# Patient Record
Sex: FEMALE | Race: WHITE | Employment: FULL TIME | URBAN - METROPOLITAN AREA
[De-identification: names, ages, dates, MRNs, and addresses within clinical notes are randomized per-mention and may not be internally consistent; named-entity substitution may affect disease eponyms.]

---

## 2021-01-28 ENCOUNTER — AMBULATORY - HEALTHEAST (OUTPATIENT)
Dept: NURSING | Facility: CLINIC | Age: 41
End: 2021-01-28

## 2021-02-18 ENCOUNTER — AMBULATORY - HEALTHEAST (OUTPATIENT)
Dept: NURSING | Facility: CLINIC | Age: 41
End: 2021-02-18

## 2022-12-23 ENCOUNTER — TRANSFERRED RECORDS (OUTPATIENT)
Dept: HEALTH INFORMATION MANAGEMENT | Facility: CLINIC | Age: 42
End: 2022-12-23

## 2022-12-29 ENCOUNTER — TRANSCRIBE ORDERS (OUTPATIENT)
Dept: OTHER | Age: 42
End: 2022-12-29

## 2022-12-29 DIAGNOSIS — M25.551 RIGHT HIP PAIN: Primary | ICD-10-CM

## 2023-01-17 ENCOUNTER — THERAPY VISIT (OUTPATIENT)
Dept: PHYSICAL THERAPY | Facility: CLINIC | Age: 43
End: 2023-01-17
Attending: FAMILY MEDICINE
Payer: COMMERCIAL

## 2023-01-17 DIAGNOSIS — M25.551 HIP PAIN, RIGHT: ICD-10-CM

## 2023-01-17 PROCEDURE — 97161 PT EVAL LOW COMPLEX 20 MIN: CPT | Mod: GP | Performed by: PHYSICAL THERAPIST

## 2023-01-17 PROCEDURE — 97112 NEUROMUSCULAR REEDUCATION: CPT | Mod: GP | Performed by: PHYSICAL THERAPIST

## 2023-01-17 PROCEDURE — 97110 THERAPEUTIC EXERCISES: CPT | Mod: GP | Performed by: PHYSICAL THERAPIST

## 2023-01-17 NOTE — PROGRESS NOTES
Physical Therapy Initial Evaluation  Subjective:    Patient Health History  Consuelo Schmid being seen for R hip pain.     Problem began: 12/23/2022 (MD visit).   Problem occurred: 6 months ago insidious and gradual onset over time with no specific injury.  Has had L hip pain in the past and also has had LBP in the past but not now.   Pain is reported as 9/10 on pain scale.  General health as reported by patient is good.  Pertinent medical history includes: migraines/headaches, smoking, overweight, depression and history of fractures (tibia/fibula fracture when 3 years old, Raynouds syndrome).   Red flags:  Pain at rest/night.  Medical allergies: other. Other medical allergies details: amoxicillin.   Surgeries include:  Other. Other surgery history details: ethelvalery alejandro L foot excision, C secection x 2, cholescystectomy.    Current medications:  Anti-inflammatory and anti-depressants.    Current occupation is Physician Assistant.   Primary job tasks include:  Computer work, prolonged sitting and prolonged standing.   Other job/home tasks details: getting up from a crouch, sitting on the floor.                Therapist Generated HPI Evaluation         Type of problem:  Right hip.    This is a chronic condition.      Patient reports pain:  Anterior, groin, lateral and IT band (lateral thigh to knee occasionally).  Pain is described as aching and is intermittent.  Pain radiates to:  Thigh. Pain is worse during the day (with certain activities).  Since onset symptoms are gradually worsening.  Associated symptoms:  Loss of strength. Exacerbated by: getting up from crouch position, doing skin exams at work., getting up after sitting even 15 min painful, not able to sit on the floor with kids as too painful/difficult to get up after.  Sitting with leg in ER.  Overall goal to get more active safely.  and relieved by ice and NSAID's.  Special tests included:  X-ray (xray pelvis/hip unremarkable other than mild DJD B  SIJ ).    Restrictions due to condition include:  Working in normal job without restrictions.  Barriers include:  None as reported by patient (2 kids at home 9 and 11 years old).                        Objective:  System         Lumbar/SI Evaluation  ROM:    AROM Lumbar:   Flexion:          No loss  Ext:                    Mod loss    Side Bend:        Left:     Right:   Rotation:           Left:     Right:   Side Glide:        Left:  No loss mild lateral hip strain, NW after    Right:  No loss mild R LBP, NW after                                                              Hip Evaluation  HIP AROM:    Flexion: Left: 90    Right:  90 +pain     Extension: Left: 33    Right:  25  Abduction: Left:  35    Right:  35      Internal Rotation: Left: 38 + pinch    Right: 30 +pinch at ERP  External Rotation: Left: 64    Right: 80 no pain        Hip Strength:    Flexion:   Left: 5/5   Pain:  Right: 5-/5    Pain: strong/painful                    Extension:  Left: 4+/5  Pain:Right: 4+/5    Pain:    Abduction:  Left: 4/5      Pain:weak/painfulRight: 4/5     Pain:weak/pain free  Adduction:  Left: /5   Pain:weak/painful  Internal Rotation:  Left: 5/5    Pain:Right: 5/5   Pain:  External Rotation:  Left: 4+/5    Pain: strong/painful  Right: 4+/5    Pain: strong/painful  Knee Flexion:  Left: 5/5   Pain:Right: 5/5   Pain:  Knee Extension:  Left: 5/5   Pain:Right: 5/5    Pain:        Hip Special Testing:   Special tests hip not assessed: Pain with piriformis testing <90 and >90 with pain pinching in the groin- not from piriformis.  (-) B hip scour.  (-) long axis log roll     Left hip negative for the following special tests:  Piriformis; Evelyne; Fadir/Labrum or SLR  Right hip negative for the following special tests:  Piriformis; Evelyne; Fadir/Labrum or SLR                   Arnold Lumbar Evaluation    Posture:  Sitting: fair  Standing: fair  Lordosis: WNL  Lateral Shift: no  Correction of Posture: better                                                    ROS    Assessment/Plan:    Patient is a 42 year old female with right side hip complaints.    Patient has the following significant findings with corresponding treatment plan.                Diagnosis 1:  R hip pain    Pain -  hot/cold therapy, manual therapy, self management, education and home program  Decreased ROM/flexibility - manual therapy, therapeutic exercise, therapeutic activity and home program  Decreased strength - therapeutic exercise, therapeutic activities and home program  Decreased function - therapeutic activities and home program  Impaired posture - neuro re-education, therapeutic activities and home program    Therapy Evaluation Codes:    Cumulative Therapy Evaluation is: Low complexity.    Previous and current functional limitations:  (See Goal Flow Sheet for this information)    Short term and Long term goals: (See Goal Flow Sheet for this information)     Communication ability:  Patient appears to be able to clearly communicate and understand verbal and written communication and follow directions correctly.  Treatment Explanation - The following has been discussed with the patient:   RX ordered/plan of care  Anticipated outcomes  Possible risks and side effects  This patient would benefit from PT intervention to resume normal activities.   Rehab potential is good.    Frequency:  1 X week, once daily  Duration:  for 6 weeks  Discharge Plan:  Achieve all LTG.  Independent in home treatment program.  Reach maximal therapeutic benefit.    Please refer to the daily flowsheet for treatment today, total treatment time and time spent performing 1:1 timed codes.

## 2023-01-17 NOTE — LETTER
[facility]  [address] [address] [place] [id number]  [phone]    2023    Re: Consuelo Schmid   :   1980  MRN:  [id number]   REFERRING PHYSICIAN:   Kathy DOUGLAS [facility]    Date of Initial Evaluation:  2023  Visits:  Rxs Used: 1  Reason for Referral:  Hip pain, right    EVALUATION SUMMARY    Physical Therapy Initial Evaluation  Subjective:    Patient Health History  Consuelo Schmid being seen for R hip pain.     Problem began: 2022 (MD visit).   Problem occurred: 6 months ago insidious and gradual onset over time with no specific injury.  Has had L hip pain in the past and also has had LBP in the past but not now.   Pain is reported as 9/10 on pain scale.  General health as reported by patient is good.  Pertinent medical history includes: migraines/headaches, smoking, overweight, depression and history of fractures (tibia/fibula fracture when 3 years old, Raynouds syndrome).   Red flags:  Pain at rest/night.  Medical allergies: other. Other medical allergies details: amoxicillin.   Surgeries include:  Other. Other surgery history details: daniella allen L foot excision, C secection x 2, cholescystectomy.    Current medications:  Anti-inflammatory and anti-depressants.    Current occupation is Physician Assistant.   Primary job tasks include:  Computer work, prolonged sitting and prolonged standing.   Other job/home tasks details: getting up from a crouch, sitting on the floor.                Therapist Generated HPI Evaluation         Type of problem:  Right hip.    This is a chronic condition.      Patient reports pain:  Anterior, groin, lateral and IT band (lateral thigh to knee occasionally).  Pain is described as aching and is intermittent.  Re: Consuelo Schmid   :   1980  MRN:  [id number]       Pain radiates to:  Thigh. Pain is worse during the  day (with certain activities).  Since onset symptoms are gradually worsening.  Associated symptoms:  Loss of strength. Exacerbated by: getting up from crouch position, doing skin exams at work., getting up after sitting even 15 min painful, not able to sit on the floor with kids as too painful/difficult to get up after.  Sitting with leg in ER.  Overall goal to get more active safely.  and relieved by ice and NSAID's.  Special tests included:  X-ray (xray pelvis/hip unremarkable other than mild DJD B SIJ ).    Restrictions due to condition include:  Working in normal job without restrictions.  Barriers include:  None as reported by patient (2 kids at home 9 and 11 years old).    Objective:  System    Lumbar/SI Evaluation  ROM:    AROM Lumbar:   Flexion:          No loss  Ext:                    Mod loss    Side Bend:        Left:     Right:   Rotation:           Left:     Right:   Side Glide:        Left:  No loss mild lateral hip strain, NW after    Right:  No loss mild R LBP, NW after        Hip Evaluation  HIP AROM:    Flexion: Left: 90    Right:  90 +pain   Extension: Left: 33    Right:  25  Abduction: Left:  35    Right:  35  Internal Rotation: Left: 38 + pinch    Right: 30 +pinch at ERP  External Rotation: Left: 64    Right: 80 no pain    Hip Strength:    Flexion:   Left: 5/5   Pain:  Right: 5-/5    Pain: strong/painful                    Extension:  Left: 4+/5  Pain:Right: 4+/5    Pain:    Abduction:  Left: 4/5      Pain:weak/painfulRight: 4/5     Pain:weak/pain free  Adduction:  Left: /5   Pain:weak/painful  Internal Rotation:  Left: 5/5    Pain:Right: 5/5   Pain:  External Rotation:  Left: 4+/5    Pain: strong/painful  Right: 4+/5    Pain: strong/painful  Knee Flexion:  Left: 5/5   Pain:Right: 5/5   Pain:  Knee Extension:  Left: 5/5   Pain:Right: 5/5    Pain:    Hip Special Testing:   Special tests hip not assessed: Pain with piriformis testing <90 and >90 with pain pinching in the groin- not from  piriformis.  (-) B hip scour.  (-) long axis log roll     Left hip negative for the following special tests:  Piriformis; Evelyne; Fadir/Labrum or SLR  Right hip negative for the following special tests:  Piriformis; Evelyne; Fadir/Labrum or SLR        Arnold Lumbar Evaluation    Posture:  Sitting: fair  Standing: fair  Lordosis: WNL  Lateral Shift: no  Correction of Posture: better    ROS    Assessment/Plan:    Patient is a 42 year old female with right side hip complaints.    Patient has the following significant findings with corresponding treatment plan.                Diagnosis 1:  R hip pain    Pain -  hot/cold therapy, manual therapy, self management, education and home program  Decreased ROM/flexibility - manual therapy, therapeutic exercise, therapeutic activity and home program  Decreased strength - therapeutic exercise, therapeutic activities and home program  Decreased function - therapeutic activities and home program  Impaired posture - neuro re-education, therapeutic activities and home program    Therapy Evaluation Codes:    Cumulative Therapy Evaluation is: Low complexity.    Previous and current functional limitations:  (See Goal Flow Sheet for this information)    Short term and Long term goals: (See Goal Flow Sheet for this information)     Communication ability:  Patient appears to be able to clearly communicate and understand verbal and written communication and follow directions correctly.  Treatment Explanation - The following has been discussed with the patient:   RX ordered/plan of care  Anticipated outcomes  Possible risks and side effects  This patient would benefit from PT intervention to resume normal activities.   Rehab potential is good.    Frequency:  1 X week, once daily  Duration:  for 6 weeks  Discharge Plan:  Achieve all LTG.  Independent in home treatment program.  Reach maximal therapeutic benefit.                    Re: Consuelo Schmid   :   1980  MRN:  [id number]        Please refer to the daily flowsheet for treatment today, total treatment time and time spent performing 1:1 timed codes.         Thank you for your referral.    INQUIRIES  Therapist: Myranda Weston DPT, Cert MDT, [address] [address]  Phone: [phone]  Fax: [phone]

## 2023-01-18 ASSESSMENT — ACTIVITIES OF DAILY LIVING (ADL)
WALKING_UP_STEEP_HILLS: MODERATE DIFFICULTY
WALKING_APPROXIMATELY_10_MINUTES: SLIGHT DIFFICULTY
WALKING_15_MINUTES_OR_GREATER: MODERATE DIFFICULTY
WALKING_DOWN_STEEP_HILLS: SLIGHT DIFFICULTY
ROLLING_OVER_IN_BED: SLIGHT DIFFICULTY
RECREATIONAL_ACTIVITIES: MODERATE DIFFICULTY
PUTTING_ON_SOCKS_AND_SHOES: SLIGHT DIFFICULTY
WALKING_INITIALLY: MODERATE DIFFICULTY
TWISTING/PIVOTING_ON_INVOLVED_LEG: SLIGHT DIFFICULTY
HOS_ADL_COUNT: 16
SITTING_FOR_15_MINUTES: SLIGHT DIFFICULTY
HOS_ADL_SCORE(%): 68.75
LIGHT_TO_MODERATE_WORK: SLIGHT DIFFICULTY
STEPPING_UP_AND_DOWN_CURBS: NO DIFFICULTY AT ALL
GETTING_INTO_AND_OUT_OF_AN_AVERAGE_CAR: NO DIFFICULTY AT ALL
HOS_ADL_HIGHEST_POTENTIAL_SCORE: 64
GOING_DOWN_1_FLIGHT_OF_STAIRS: NO DIFFICULTY AT ALL
HEAVY_WORK: MODERATE DIFFICULTY
DEEP_SQUATTING: MODERATE DIFFICULTY
STANDING_FOR_15_MINUTES: MODERATE DIFFICULTY
HOS_ADL_ITEM_SCORE_TOTAL: 44
HOW_WOULD_YOU_RATE_YOUR_CURRENT_LEVEL_OF_FUNCTION_DURING_YOUR_USUAL_ACTIVITIES_OF_DAILY_LIVING_FROM_0_TO_100_WITH_100_BEING_YOUR_LEVEL_OF_FUNCTION_PRIOR_TO_YOUR_HIP_PROBLEM_AND_0_BEING_THE_INABILITY_TO_PERFORM_ANY_OF_YOUR_USUAL_DAILY_ACTIVITIES?: 80
GOING_UP_1_FLIGHT_OF_STAIRS: SLIGHT DIFFICULTY

## 2023-02-03 ENCOUNTER — THERAPY VISIT (OUTPATIENT)
Dept: PHYSICAL THERAPY | Facility: CLINIC | Age: 43
End: 2023-02-03
Attending: FAMILY MEDICINE
Payer: COMMERCIAL

## 2023-02-03 DIAGNOSIS — M25.551 HIP PAIN, RIGHT: Primary | ICD-10-CM

## 2023-02-03 PROCEDURE — 97140 MANUAL THERAPY 1/> REGIONS: CPT | Mod: GP

## 2023-02-03 PROCEDURE — 97110 THERAPEUTIC EXERCISES: CPT | Mod: GP

## 2023-02-10 ENCOUNTER — THERAPY VISIT (OUTPATIENT)
Dept: PHYSICAL THERAPY | Facility: CLINIC | Age: 43
End: 2023-02-10
Attending: FAMILY MEDICINE
Payer: COMMERCIAL

## 2023-02-10 DIAGNOSIS — M25.551 HIP PAIN, RIGHT: Primary | ICD-10-CM

## 2023-02-10 PROCEDURE — 97112 NEUROMUSCULAR REEDUCATION: CPT | Mod: GP

## 2023-02-10 PROCEDURE — 97110 THERAPEUTIC EXERCISES: CPT | Mod: GP

## 2023-02-24 ENCOUNTER — THERAPY VISIT (OUTPATIENT)
Dept: PHYSICAL THERAPY | Facility: CLINIC | Age: 43
End: 2023-02-24
Payer: COMMERCIAL

## 2023-02-24 DIAGNOSIS — M25.551 HIP PAIN, RIGHT: Primary | ICD-10-CM

## 2023-02-24 PROCEDURE — 97112 NEUROMUSCULAR REEDUCATION: CPT | Mod: GP

## 2023-02-24 PROCEDURE — 97110 THERAPEUTIC EXERCISES: CPT | Mod: GP

## 2023-03-03 ENCOUNTER — THERAPY VISIT (OUTPATIENT)
Dept: PHYSICAL THERAPY | Facility: CLINIC | Age: 43
End: 2023-03-03
Payer: COMMERCIAL

## 2023-03-03 DIAGNOSIS — M25.551 HIP PAIN, RIGHT: Primary | ICD-10-CM

## 2023-03-03 PROCEDURE — 97110 THERAPEUTIC EXERCISES: CPT | Mod: GP

## 2023-03-03 PROCEDURE — 97140 MANUAL THERAPY 1/> REGIONS: CPT | Mod: GP

## 2023-03-17 ENCOUNTER — THERAPY VISIT (OUTPATIENT)
Dept: PHYSICAL THERAPY | Facility: CLINIC | Age: 43
End: 2023-03-17
Payer: COMMERCIAL

## 2023-03-17 DIAGNOSIS — M25.551 HIP PAIN, RIGHT: Primary | ICD-10-CM

## 2023-03-17 PROCEDURE — 97530 THERAPEUTIC ACTIVITIES: CPT | Mod: GP

## 2023-03-17 PROCEDURE — 97110 THERAPEUTIC EXERCISES: CPT | Mod: GP

## 2023-03-17 PROCEDURE — 97140 MANUAL THERAPY 1/> REGIONS: CPT | Mod: GP

## 2023-03-17 ASSESSMENT — ACTIVITIES OF DAILY LIVING (ADL)
LIGHT_TO_MODERATE_WORK: NO DIFFICULTY AT ALL
HEAVY_WORK: NO DIFFICULTY AT ALL
HOS_ADL_HIGHEST_POTENTIAL_SCORE: 68
HOS_ADL_COUNT: 17
GOING_DOWN_1_FLIGHT_OF_STAIRS: NO DIFFICULTY AT ALL
TWISTING/PIVOTING_ON_INVOLVED_LEG: SLIGHT DIFFICULTY
STANDING_FOR_15_MINUTES: SLIGHT DIFFICULTY
DEEP_SQUATTING: NO DIFFICULTY AT ALL
PUTTING_ON_SOCKS_AND_SHOES: NO DIFFICULTY AT ALL
HOS_ADL_ITEM_SCORE_TOTAL: 61
HOS_ADL_SCORE(%): 89.71
WALKING_15_MINUTES_OR_GREATER: MODERATE DIFFICULTY
GETTING_INTO_AND_OUT_OF_A_BATHTUB: NO DIFFICULTY AT ALL
WALKING_INITIALLY: NO DIFFICULTY AT ALL
GOING_UP_1_FLIGHT_OF_STAIRS: NO DIFFICULTY AT ALL
WALKING_UP_STEEP_HILLS: NO DIFFICULTY AT ALL
STEPPING_UP_AND_DOWN_CURBS: NO DIFFICULTY AT ALL
SITTING_FOR_15_MINUTES: SLIGHT DIFFICULTY
WALKING_DOWN_STEEP_HILLS: NO DIFFICULTY AT ALL
RECREATIONAL_ACTIVITIES: SLIGHT DIFFICULTY
GETTING_INTO_AND_OUT_OF_AN_AVERAGE_CAR: NO DIFFICULTY AT ALL
ROLLING_OVER_IN_BED: SLIGHT DIFFICULTY
WALKING_APPROXIMATELY_10_MINUTES: SLIGHT DIFFICULTY
HOW_WOULD_YOU_RATE_YOUR_CURRENT_LEVEL_OF_FUNCTION_DURING_YOUR_USUAL_ACTIVITIES_OF_DAILY_LIVING_FROM_0_TO_100_WITH_100_BEING_YOUR_LEVEL_OF_FUNCTION_PRIOR_TO_YOUR_HIP_PROBLEM_AND_0_BEING_THE_INABILITY_TO_PERFORM_ANY_OF_YOUR_USUAL_DAILY_ACTIVITIES?: 90

## 2023-03-17 NOTE — PROGRESS NOTES
PROGRESS  REPORT    Progress reporting period is from 1/18/23 to 3/17/23.       SUBJECTIVE  Subjective changes noted by patient: Patient reports she hasn't done HEP super diligently. She reports she feels pain continues to be intermittent. Crossing R leg over LLE, laying on R side, and sitting on floor. She reports HEP helps pain for the rest of the day.    Current Pain level: 1/10.      Initial Pain level: 9/10.   Changes in function:  Yes (See Goal flowsheet attached for changes in current functional level)  Adverse reaction to treatment or activity: None    OBJECTIVE  Changes noted in objective findings:  Yes  Objective: R hip AROM: WNL pain at end range flexion at anterior hip - pain decreased after lateral distraction mob ;   Hip MMT: grossly 4/5 with anterior hip pain during flexion and IR;   SLS: 30 sec samir with trendelenburg on RLE - reports slight pain in anterior hip on RLE - when cued to squeeze R glute pain decreased ;   functional squat: 3/4  squat without pain, functional valgum     HOS: 1/18/23: 68.75% improved to 89.7% on 3/17/23    ASSESSMENT/PLAN  Updated problem list and treatment plan: Diagnosis 1:  Right hip pain  Pain -  manual therapy, self management, education and home program  Decreased joint mobility - manual therapy, therapeutic exercise, therapeutic activity and home program  Decreased strength - therapeutic exercise, therapeutic activities and home program  Decreased proprioception - neuro re-education, therapeutic activities and home program  Decreased function - therapeutic activities and home program  STG/LTGs have been met or progress has been made towards goals:  Yes (See Goal flow sheet completed today.)  Assessment of Progress: The patient's condition has potential to improve.  Self Management Plans:  Patient has been instructed in a home treatment program.  Patient  has been instructed in self management of symptoms.  I have re-evaluated this patient and find that the nature,  scope, duration and intensity of the therapy is appropriate for the medical condition of the patient.  Consuelo continues to require the following intervention to meet STG and LTG's:  PT    Recommendations:  This patient would benefit from continued therapy.     Frequency:  2 X a month, once daily  Duration:  for 2 months        Please refer to the daily flowsheet for treatment today, total treatment time and time spent performing 1:1 timed codes.

## 2023-04-16 ENCOUNTER — HEALTH MAINTENANCE LETTER (OUTPATIENT)
Age: 43
End: 2023-04-16

## 2023-05-03 NOTE — PROGRESS NOTES
Patient did not return for further treatment and no additional progress was noted.  Please refer to the progress note and goal flowsheet completed on 03/17/23 for discharge information.

## 2023-09-17 ENCOUNTER — HEALTH MAINTENANCE LETTER (OUTPATIENT)
Age: 43
End: 2023-09-17

## 2024-06-22 ENCOUNTER — HEALTH MAINTENANCE LETTER (OUTPATIENT)
Age: 44
End: 2024-06-22

## 2025-07-12 ENCOUNTER — HEALTH MAINTENANCE LETTER (OUTPATIENT)
Age: 45
End: 2025-07-12